# Patient Record
Sex: MALE | Race: BLACK OR AFRICAN AMERICAN | Employment: FULL TIME | URBAN - METROPOLITAN AREA
[De-identification: names, ages, dates, MRNs, and addresses within clinical notes are randomized per-mention and may not be internally consistent; named-entity substitution may affect disease eponyms.]

---

## 2022-04-26 ENCOUNTER — ANESTHESIA EVENT (OUTPATIENT)
Dept: MEDSURG UNIT | Age: 32
End: 2022-04-26
Payer: COMMERCIAL

## 2022-04-27 ENCOUNTER — HOSPITAL ENCOUNTER (OUTPATIENT)
Age: 32
Setting detail: OUTPATIENT SURGERY
Discharge: HOME | End: 2022-04-27
Attending: OTOLARYNGOLOGY | Admitting: OTOLARYNGOLOGY
Payer: COMMERCIAL

## 2022-04-27 ENCOUNTER — ANESTHESIA (OUTPATIENT)
Dept: MEDSURG UNIT | Age: 32
End: 2022-04-27
Payer: COMMERCIAL

## 2022-04-27 VITALS
HEIGHT: 66 IN | HEART RATE: 68 BPM | SYSTOLIC BLOOD PRESSURE: 104 MMHG | RESPIRATION RATE: 19 BRPM | TEMPERATURE: 97.8 F | WEIGHT: 155 LBS | OXYGEN SATURATION: 97 % | DIASTOLIC BLOOD PRESSURE: 74 MMHG | BODY MASS INDEX: 24.91 KG/M2

## 2022-04-27 DIAGNOSIS — G89.18 POSTOPERATIVE PAIN: Primary | ICD-10-CM

## 2022-04-27 PROCEDURE — 76210000034 HC AMBSU PH I REC 0.5 TO 1 HR: Performed by: OTOLARYNGOLOGY

## 2022-04-27 PROCEDURE — 77030008683 HC TU ET CUF COVD -A: Performed by: ANESTHESIOLOGY

## 2022-04-27 PROCEDURE — 77030019908 HC STETH ESOPH SIMS -A: Performed by: STUDENT IN AN ORGANIZED HEALTH CARE EDUCATION/TRAINING PROGRAM

## 2022-04-27 PROCEDURE — 76030000001 HC AMB SURG OR TIME 1 TO 1.5: Performed by: OTOLARYNGOLOGY

## 2022-04-27 PROCEDURE — 74011000250 HC RX REV CODE- 250: Performed by: OTOLARYNGOLOGY

## 2022-04-27 PROCEDURE — 74011250636 HC RX REV CODE- 250/636: Performed by: ANESTHESIOLOGY

## 2022-04-27 PROCEDURE — 76060000062 HC AMB SURG ANES 1 TO 1.5 HR: Performed by: OTOLARYNGOLOGY

## 2022-04-27 PROCEDURE — 88305 TISSUE EXAM BY PATHOLOGIST: CPT

## 2022-04-27 PROCEDURE — 77030026438 HC STYL ET INTUB CARD -A: Performed by: ANESTHESIOLOGY

## 2022-04-27 PROCEDURE — 77030036990: Performed by: OTOLARYNGOLOGY

## 2022-04-27 PROCEDURE — 2709999900 HC NON-CHARGEABLE SUPPLY: Performed by: OTOLARYNGOLOGY

## 2022-04-27 PROCEDURE — 74011000250 HC RX REV CODE- 250: Performed by: ANESTHESIOLOGY

## 2022-04-27 PROCEDURE — 77030040922 HC BLNKT HYPOTHRM STRY -A

## 2022-04-27 RX ORDER — FENTANYL CITRATE 50 UG/ML
INJECTION, SOLUTION INTRAMUSCULAR; INTRAVENOUS AS NEEDED
Status: DISCONTINUED | OUTPATIENT
Start: 2022-04-27 | End: 2022-04-27 | Stop reason: HOSPADM

## 2022-04-27 RX ORDER — HYDROMORPHONE HYDROCHLORIDE 1 MG/ML
0.2 INJECTION, SOLUTION INTRAMUSCULAR; INTRAVENOUS; SUBCUTANEOUS
Status: DISCONTINUED | OUTPATIENT
Start: 2022-04-27 | End: 2022-04-27 | Stop reason: HOSPADM

## 2022-04-27 RX ORDER — MIDAZOLAM HYDROCHLORIDE 1 MG/ML
1 INJECTION, SOLUTION INTRAMUSCULAR; INTRAVENOUS AS NEEDED
Status: DISCONTINUED | OUTPATIENT
Start: 2022-04-27 | End: 2022-04-27 | Stop reason: HOSPADM

## 2022-04-27 RX ORDER — SODIUM CHLORIDE, SODIUM LACTATE, POTASSIUM CHLORIDE, CALCIUM CHLORIDE 600; 310; 30; 20 MG/100ML; MG/100ML; MG/100ML; MG/100ML
125 INJECTION, SOLUTION INTRAVENOUS CONTINUOUS
Status: DISCONTINUED | OUTPATIENT
Start: 2022-04-27 | End: 2022-04-27 | Stop reason: HOSPADM

## 2022-04-27 RX ORDER — SUCCINYLCHOLINE CHLORIDE 20 MG/ML
INJECTION INTRAMUSCULAR; INTRAVENOUS AS NEEDED
Status: DISCONTINUED | OUTPATIENT
Start: 2022-04-27 | End: 2022-04-27 | Stop reason: HOSPADM

## 2022-04-27 RX ORDER — SODIUM CHLORIDE 0.9 % (FLUSH) 0.9 %
5-40 SYRINGE (ML) INJECTION AS NEEDED
Status: DISCONTINUED | OUTPATIENT
Start: 2022-04-27 | End: 2022-04-27 | Stop reason: HOSPADM

## 2022-04-27 RX ORDER — FENTANYL CITRATE 50 UG/ML
50 INJECTION, SOLUTION INTRAMUSCULAR; INTRAVENOUS AS NEEDED
Status: DISCONTINUED | OUTPATIENT
Start: 2022-04-27 | End: 2022-04-27 | Stop reason: HOSPADM

## 2022-04-27 RX ORDER — SODIUM CHLORIDE 0.9 % (FLUSH) 0.9 %
5-40 SYRINGE (ML) INJECTION EVERY 8 HOURS
Status: DISCONTINUED | OUTPATIENT
Start: 2022-04-27 | End: 2022-04-27 | Stop reason: HOSPADM

## 2022-04-27 RX ORDER — LIDOCAINE HYDROCHLORIDE 20 MG/ML
INJECTION, SOLUTION EPIDURAL; INFILTRATION; INTRACAUDAL; PERINEURAL AS NEEDED
Status: DISCONTINUED | OUTPATIENT
Start: 2022-04-27 | End: 2022-04-27 | Stop reason: HOSPADM

## 2022-04-27 RX ORDER — SODIUM CHLORIDE 9 MG/ML
125 INJECTION, SOLUTION INTRAVENOUS CONTINUOUS
Status: DISCONTINUED | OUTPATIENT
Start: 2022-04-27 | End: 2022-04-27 | Stop reason: HOSPADM

## 2022-04-27 RX ORDER — SODIUM CHLORIDE 9 MG/ML
1000 INJECTION, SOLUTION INTRAVENOUS CONTINUOUS
Status: DISCONTINUED | OUTPATIENT
Start: 2022-04-27 | End: 2022-04-27 | Stop reason: HOSPADM

## 2022-04-27 RX ORDER — SODIUM CHLORIDE, SODIUM LACTATE, POTASSIUM CHLORIDE, CALCIUM CHLORIDE 600; 310; 30; 20 MG/100ML; MG/100ML; MG/100ML; MG/100ML
1000 INJECTION, SOLUTION INTRAVENOUS CONTINUOUS
Status: DISCONTINUED | OUTPATIENT
Start: 2022-04-27 | End: 2022-04-27 | Stop reason: HOSPADM

## 2022-04-27 RX ORDER — EPHEDRINE SULFATE/0.9% NACL/PF 50 MG/5 ML
5 SYRINGE (ML) INTRAVENOUS AS NEEDED
Status: DISCONTINUED | OUTPATIENT
Start: 2022-04-27 | End: 2022-04-27 | Stop reason: HOSPADM

## 2022-04-27 RX ORDER — MIDAZOLAM HYDROCHLORIDE 1 MG/ML
INJECTION, SOLUTION INTRAMUSCULAR; INTRAVENOUS AS NEEDED
Status: DISCONTINUED | OUTPATIENT
Start: 2022-04-27 | End: 2022-04-27 | Stop reason: HOSPADM

## 2022-04-27 RX ORDER — MIDAZOLAM HYDROCHLORIDE 1 MG/ML
0.5 INJECTION, SOLUTION INTRAMUSCULAR; INTRAVENOUS
Status: DISCONTINUED | OUTPATIENT
Start: 2022-04-27 | End: 2022-04-27 | Stop reason: HOSPADM

## 2022-04-27 RX ORDER — ONDANSETRON 2 MG/ML
4 INJECTION INTRAMUSCULAR; INTRAVENOUS AS NEEDED
Status: DISCONTINUED | OUTPATIENT
Start: 2022-04-27 | End: 2022-04-27 | Stop reason: HOSPADM

## 2022-04-27 RX ORDER — LIDOCAINE HYDROCHLORIDE 10 MG/ML
0.1 INJECTION, SOLUTION EPIDURAL; INFILTRATION; INTRACAUDAL; PERINEURAL AS NEEDED
Status: DISCONTINUED | OUTPATIENT
Start: 2022-04-27 | End: 2022-04-27 | Stop reason: HOSPADM

## 2022-04-27 RX ORDER — LIDOCAINE HYDROCHLORIDE AND EPINEPHRINE 10; 10 MG/ML; UG/ML
INJECTION, SOLUTION INFILTRATION; PERINEURAL AS NEEDED
Status: DISCONTINUED | OUTPATIENT
Start: 2022-04-27 | End: 2022-04-27 | Stop reason: HOSPADM

## 2022-04-27 RX ORDER — SODIUM CHLORIDE, SODIUM LACTATE, POTASSIUM CHLORIDE, CALCIUM CHLORIDE 600; 310; 30; 20 MG/100ML; MG/100ML; MG/100ML; MG/100ML
INJECTION, SOLUTION INTRAVENOUS
Status: DISCONTINUED | OUTPATIENT
Start: 2022-04-27 | End: 2022-04-27 | Stop reason: HOSPADM

## 2022-04-27 RX ORDER — ONDANSETRON 2 MG/ML
INJECTION INTRAMUSCULAR; INTRAVENOUS AS NEEDED
Status: DISCONTINUED | OUTPATIENT
Start: 2022-04-27 | End: 2022-04-27 | Stop reason: HOSPADM

## 2022-04-27 RX ORDER — HYDROCODONE BITARTRATE AND ACETAMINOPHEN 5; 325 MG/1; MG/1
1 TABLET ORAL
Qty: 15 TABLET | Refills: 0 | Status: SHIPPED | OUTPATIENT
Start: 2022-04-27 | End: 2022-04-30

## 2022-04-27 RX ORDER — MORPHINE SULFATE 2 MG/ML
2 INJECTION, SOLUTION INTRAMUSCULAR; INTRAVENOUS
Status: DISCONTINUED | OUTPATIENT
Start: 2022-04-27 | End: 2022-04-27 | Stop reason: HOSPADM

## 2022-04-27 RX ORDER — OXYCODONE AND ACETAMINOPHEN 5; 325 MG/1; MG/1
1 TABLET ORAL AS NEEDED
Status: DISCONTINUED | OUTPATIENT
Start: 2022-04-27 | End: 2022-04-27 | Stop reason: HOSPADM

## 2022-04-27 RX ORDER — METHYLPREDNISOLONE 4 MG/1
TABLET ORAL
Qty: 1 DOSE PACK | Refills: 0 | Status: SHIPPED | OUTPATIENT
Start: 2022-04-27

## 2022-04-27 RX ORDER — FENTANYL CITRATE 50 UG/ML
25 INJECTION, SOLUTION INTRAMUSCULAR; INTRAVENOUS
Status: DISCONTINUED | OUTPATIENT
Start: 2022-04-27 | End: 2022-04-27 | Stop reason: HOSPADM

## 2022-04-27 RX ORDER — DEXMEDETOMIDINE HYDROCHLORIDE 100 UG/ML
INJECTION, SOLUTION INTRAVENOUS AS NEEDED
Status: DISCONTINUED | OUTPATIENT
Start: 2022-04-27 | End: 2022-04-27 | Stop reason: HOSPADM

## 2022-04-27 RX ORDER — ROCURONIUM BROMIDE 10 MG/ML
INJECTION, SOLUTION INTRAVENOUS AS NEEDED
Status: DISCONTINUED | OUTPATIENT
Start: 2022-04-27 | End: 2022-04-27 | Stop reason: HOSPADM

## 2022-04-27 RX ORDER — PROPOFOL 10 MG/ML
INJECTION, EMULSION INTRAVENOUS AS NEEDED
Status: DISCONTINUED | OUTPATIENT
Start: 2022-04-27 | End: 2022-04-27 | Stop reason: HOSPADM

## 2022-04-27 RX ORDER — ACETAMINOPHEN 500 MG
1000 TABLET ORAL ONCE
Status: DISCONTINUED | OUTPATIENT
Start: 2022-04-27 | End: 2022-04-27 | Stop reason: HOSPADM

## 2022-04-27 RX ORDER — DIPHENHYDRAMINE HYDROCHLORIDE 50 MG/ML
12.5 INJECTION, SOLUTION INTRAMUSCULAR; INTRAVENOUS AS NEEDED
Status: DISCONTINUED | OUTPATIENT
Start: 2022-04-27 | End: 2022-04-27 | Stop reason: HOSPADM

## 2022-04-27 RX ORDER — PHENYLEPHRINE HCL IN 0.9% NACL 0.4MG/10ML
SYRINGE (ML) INTRAVENOUS AS NEEDED
Status: DISCONTINUED | OUTPATIENT
Start: 2022-04-27 | End: 2022-04-27 | Stop reason: HOSPADM

## 2022-04-27 RX ORDER — DEXAMETHASONE SODIUM PHOSPHATE 4 MG/ML
INJECTION, SOLUTION INTRA-ARTICULAR; INTRALESIONAL; INTRAMUSCULAR; INTRAVENOUS; SOFT TISSUE AS NEEDED
Status: DISCONTINUED | OUTPATIENT
Start: 2022-04-27 | End: 2022-04-27 | Stop reason: HOSPADM

## 2022-04-27 RX ADMIN — DEXMEDETOMIDINE HYDROCHLORIDE 4 MCG: 100 INJECTION, SOLUTION, CONCENTRATE INTRAVENOUS at 08:51

## 2022-04-27 RX ADMIN — DEXAMETHASONE SODIUM PHOSPHATE 10 MG: 4 INJECTION, SOLUTION INTRAMUSCULAR; INTRAVENOUS at 08:26

## 2022-04-27 RX ADMIN — MIDAZOLAM 2 MG: 1 INJECTION INTRAMUSCULAR; INTRAVENOUS at 08:18

## 2022-04-27 RX ADMIN — Medication 80 MCG: at 09:02

## 2022-04-27 RX ADMIN — ROCURONIUM BROMIDE 5 MG: 10 SOLUTION INTRAVENOUS at 08:25

## 2022-04-27 RX ADMIN — LIDOCAINE HYDROCHLORIDE 60 MG: 20 INJECTION, SOLUTION EPIDURAL; INFILTRATION; INTRACAUDAL; PERINEURAL at 08:25

## 2022-04-27 RX ADMIN — Medication 80 MCG: at 08:41

## 2022-04-27 RX ADMIN — SODIUM CHLORIDE, SODIUM LACTATE, POTASSIUM CHLORIDE, AND CALCIUM CHLORIDE: 600; 310; 30; 20 INJECTION, SOLUTION INTRAVENOUS at 08:18

## 2022-04-27 RX ADMIN — Medication 40 MCG: at 08:51

## 2022-04-27 RX ADMIN — PROPOFOL 200 MG: 10 INJECTION, EMULSION INTRAVENOUS at 08:25

## 2022-04-27 RX ADMIN — SUCCINYLCHOLINE CHLORIDE 140 MG: 20 INJECTION, SOLUTION INTRAMUSCULAR; INTRAVENOUS at 08:26

## 2022-04-27 RX ADMIN — ONDANSETRON HYDROCHLORIDE 4 MG: 2 INJECTION, SOLUTION INTRAMUSCULAR; INTRAVENOUS at 08:32

## 2022-04-27 RX ADMIN — Medication 80 MCG: at 08:36

## 2022-04-27 RX ADMIN — FENTANYL CITRATE 50 MCG: 50 INJECTION, SOLUTION INTRAMUSCULAR; INTRAVENOUS at 08:34

## 2022-04-27 RX ADMIN — FENTANYL CITRATE 50 MCG: 50 INJECTION, SOLUTION INTRAMUSCULAR; INTRAVENOUS at 08:25

## 2022-04-27 NOTE — ANESTHESIA POSTPROCEDURE EVALUATION
Procedure(s):  SUSPENSION LARYNGOSCOPY WITH REMOVAL OF PAPILLOMAS.    general    Anesthesia Post Evaluation      Multimodal analgesia: multimodal analgesia used between 6 hours prior to anesthesia start to PACU discharge  Patient location during evaluation: bedside  Patient participation: complete - patient participated  Level of consciousness: awake  Pain score: 0  Pain management: satisfactory to patient  Airway patency: patent  Anesthetic complications: no  Cardiovascular status: acceptable and blood pressure returned to baseline  Respiratory status: acceptable  Hydration status: acceptable  Comments: I have evaluated the patient and meets criteria for discharge from PACU. Vickey Castellon DO. Post anesthesia nausea and vomiting:  none  Final Post Anesthesia Temperature Assessment:  Normothermia (36.0-37.5 degrees C)      INITIAL Post-op Vital signs:   Vitals Value Taken Time   /67 04/27/22 0940   Temp 36.6 °C (97.8 °F) 04/27/22 0927   Pulse 57 04/27/22 0941   Resp 12 04/27/22 0941   SpO2 99 % 04/27/22 0941   Vitals shown include unvalidated device data.

## 2022-04-27 NOTE — DISCHARGE INSTRUCTIONS
POSTOPERTIVE INSTRUCTIONS:      ACTIVITIES   For the next 24 hours, do not drive or operate dangerous machinery or  power tools, drink alcoholic beverages, make any important personal or  business decisions, or sign any legal documents.  Limit your activity for the rest of the day.  Do NOT engage in sports, heavy work or lifting for two weeks following  Surgery. Do not lift over 10 pounds for the next two weeks. · Do not whisper  · Do not yell or scream  · Do not sing  · Try to rest your voice as much as possible. · It is ok to cough or drink water if you feel scratchiness in your throat. Try not to \"clear\" your throat by grunting. DIET   Start with cool clear liquids (water, apple juice, Pepsi, Coke, gingerale,  Popsicles).  Advance your diet as tolerated to a regular diet.  AVOID HOT DRINKS like coffee, tea, and soup on the day of surgery. MEDICATIONS  Resume your normal medications as prescribed by your primary doctor. Take the steroid (medrol dosepak) that has been sent to your pharmacy. Pain medication: _____Norco (hydrocodone with tylenol)______.  DO NOT DRIVE, OPERATE DANGEROUS MACHINERY, OR DRINK  ALCOHOL WHILE TAKING NARCOTIC PAIN MEDICATION. Extra Strength Tylenol (acetaminophen)   You may take 2 tablets every 6 hours as needed for pain.  Do not take while still taking your narcotic pain medication. WHEN TO CALL YOUR DOCTOR   A temperature greater than 100 F or 38 C.  Swelling over the face (forehead, eyes, nose, or cheeks).  Blurred vision.  Stiff neck.  Extreme drowsiness after the first day.  Inability to urinate 8 hours after surgery.  Vomiting lasting more than 4 hours.  Any other symptoms which concern you. If you have any questions or concerns call my clinic at [phone].   Call [phone] if you have chest pain or shortness of breath    FOLLOW UP:  Please call Dr. Parrish Sensing office and set up a return appointment for 10-14 days from now.    Gui Whitt, [address], [facility]  [address] [facility], [address]   (k) [phone] (z) [phone]

## 2022-04-27 NOTE — BRIEF OP NOTE
Brief Postoperative Note    Patient: Zoraida Armstrong  YOB: 1990  MRN: [id number]    Date of Procedure: 4/27/2022     Pre-Op Diagnosis: HOARSENESS/DYSPHONIA, VOCAL CORD POLYPS    Post-Op Diagnosis: Same as preoperative diagnosis.       Procedure(s):  SUSPENSION LARYNGOSCOPY WITH REMOVAL OF PAPILLOMAS    Surgeon(s):  Katey York MD    Surgical Assistant: None    Anesthesia: General     Estimated Blood Loss (mL): Minimal    Complications: None    Specimens:   ID Type Source Tests Collected by Time Destination   1 : LARYNGEAL PAPILLOMA Fresh   Katey York MD 4/27/2022 [facility] Pathology        Implants: * No implants in log *    Drains: * No LDAs found *    Findings: supraglottic, glottic and subglottic papillomas removed with coblation    Electronically Signed by Ammy Hernandez MD on 4/27/2022 at 9:00 AM

## 2022-04-27 NOTE — ANESTHESIA PREPROCEDURE EVALUATION
Relevant Problems   No relevant active problems       Anesthetic History   No history of anesthetic complications            Review of Systems / Medical History  Patient summary reviewed, nursing notes reviewed and pertinent labs reviewed    Pulmonary  Within defined limits                 Neuro/Psych   Within defined limits           Cardiovascular  Within defined limits                     GI/Hepatic/Renal  Within defined limits              Endo/Other  Within defined limits           Other Findings   Comments: Recurrent laryngeal papillomatosis           Physical Exam    Airway  Mallampati: II  TM Distance: 4 - 6 cm  Neck ROM: normal range of motion   Mouth opening: Normal     Cardiovascular    Rhythm: regular  Rate: normal         Dental  No notable dental hx       Pulmonary  Breath sounds clear to auscultation               Abdominal         Other Findings            Anesthetic Plan    ASA: 2  Anesthesia type: general          Induction: Intravenous  Anesthetic plan and risks discussed with: Patient

## 2022-04-27 NOTE — OP NOTES
[address] [facility]  OPERATIVE REPORT    Name:  Karen Mclaughlin  MR#:  [id number]  :  1990  ACCOUNT #:  [de-identified]  DATE OF SERVICE:  2022      PREOPERATIVE DIAGNOSIS:  Recurrent respiratory papillomas. POSTOPERATIVE DIAGNOSIS:  Recurrent respiratory papillomas. PROCEDURES PERFORMED:  Suspension microlaryngoscopy with removal and ablation of respiratory papillomas. SURGEON:  Palomo Alvarado MD    ASSISTANT:  None. ANESTHESIA:  General endotracheal.    COMPLICATIONS:  None. SPECIMENS REMOVED:  Laryngeal papillomas sent for permanent specimen. IMPLANTS:  None. ESTIMATED BLOOD LOSS:  Minimal.    IV FLUIDS:  1 L. DRAINS:  None. DISPOSITION:  PACU, then home. CONDITION:  Stable. OPERATING FINDINGS:  1. There were papillomas located on the right and left false vocal cord. Additionally, there were polyps on the right and left glottis and in the subglottis below the anterior commissure. 2.  These were removed with the Coblator. 3.  There were no apparent complications. BRIEF HISTORY OF PRESENT ILLNESS:  The patient is a 77-year-old gentleman with the history of recurrent respiratory papillomas. He had undergone a previous surgery for removal.  As is typically the case with this diagnosis, the papillomas recurred. He presents for repeat excision. DESCRIPTION OF PROCEDURE:  The patient was identified in the preoperative holding area and then he was brought to the operating room and he was placed in supine position. General anesthesia was induced and he was orotracheally intubated. A time-out was performed in which we identified his name, medical record number, and the proposed procedure. His face was wrapped in a blue towel to protect his eyes. A mouthguard was placed in his teeth to protect against dental injury. A Dedo laryngoscope was used to perform a direct laryngoscopy. He was suspended from a mustard stand after we had good exposure.   I used a 0-degree telescope to obtain preoperative photos of the papillomas as detailed above. I then brought in a operating microscope for binocular exposure. I used a Coblator to remove the papillomas first located in the subglottis on the right and the left. It was more bulky on the left side. I then used the Coblator to remove the papillomas on the left glottis and right glottis. It was more bulky on the left side. I used a pair of biopsy forceps to debulk the papillomas in the anterior commissure. I send these for permanent specimen. I then used Coblator to remove the remainder of the polyps that were located here and also within the more anterior subglottis. I confirmed hemostasis, but then placed neuro patties hydrated with 1% lidocaine with 1:100,000 parts epinephrine to prevent postop bleeding and to prevent laryngospasm in the immediate extubation phase. I then removed these patties and also removed the scope. I removed the mouthguard and confirmed there was no damage to the lips, teeth, or gums. I turned care of the patient over to my anesthesia colleague who weaned the anesthetic and extubated the patient. He was then transferred to the PACU in stable condition.         Jacinta Kumar MD      [id number]  D:  04/27/2022 9:13  T:  04/27/2022 17:30  JOB #:  [id number]

## 2022-04-27 NOTE — H&P
OTOLARYNGOLOGY - HEAD AND NECK SURGERY HISTORY AND PHYSICAL      CC:   hoarseness    HPI:     Adebayo Age is a 33 y.o. male with laryngeal papillomas (RRP) presenting for papilloma removal.  No major changes since office visit. History reviewed. No pertinent past medical history. Past Surgical History:   Procedure Laterality Date    HX HEENT  05/2017    laryngoscopy with removal of papillomas     Current Facility-Administered Medications   Medication Dose Route Frequency    lactated Ringers infusion  125 mL/hr IntraVENous CONTINUOUS    0.9% sodium chloride infusion  125 mL/hr IntraVENous CONTINUOUS    sodium chloride (NS) flush 5-40 mL  5-40 mL IntraVENous Q8H    sodium chloride (NS) flush 5-40 mL  5-40 mL IntraVENous PRN    lidocaine (PF) (XYLOCAINE) 10 mg/mL (1 %) injection 0.1 mL  0.1 mL SubCUTAneous PRN    fentaNYL citrate (PF) injection 50 mcg  50 mcg IntraVENous PRN    midazolam (VERSED) injection 1 mg  1 mg IntraVENous PRN    midazolam (VERSED) injection 1 mg  1 mg IntraVENous PRN    acetaminophen (TYLENOL) tablet 1,000 mg  1,000 mg Oral ONCE      Allergies   Allergen Reactions    Pcn [Penicillins] Unknown (comments)     Pt doesn't know reaction, childhood     No family history on file. Social History     Tobacco Use    Smoking status: Not on file    Smokeless tobacco: Not on file   Substance Use Topics    Alcohol use: Not on file    Drug use: Not on file         REVIEW OF SYSTEMS  A full 10 point review of systems was performed today. The review was non-pertinent other than the details already listed in the history of present illness. There were no vitals taken for this visit. PHYSICAL EXAM  General:  No acute distress. Alert and oriented x 3. MSK:   Normal muscle bulk  Psych:  Mood and affect appropriate. Neuro:  CN II - XII grossly intact bilaterally. Eyes:  PERRL/EOMI, no nystagmus. ENT:   EACs are patent, clean and dry.  TMs clear and intact with normal anatomic landmarks. No middle ear fluid. Anterior rhinoscopy without mucopurulence or polyps. OC/OP clear without masses or lesions. Lymph:  Neck soft and supple without lymphadenopathy. Resp:   No audible stridor or wheezing. Skin:   Head and neck skin is without suspicious lesions. ASSESSMENT/PLAN:  33 y.o. M with recurrent respiratory papillomas. We reviewed the risks of surgery including need for additional surgery, bleeding, infection, chipped or damaged teeth, damage to lips/gums/throat, bleeding, difficulty breathing, worsened dysphonia, recurrence, scarring and the general risks of anesthesia including death as explained by the anesthesiology team.  Written consent obtained for direct laryngoscopy and removal of respiratory papillomas. Will plan to proceed with procedure as discussed. The patient was counseled at length about the risks of adrien Covid-19 during their perioperative period and any recovery window from their procedure. The patient was made aware that adrien Covid-19  may worsen their prognosis for recovering from their procedure and lend to a higher morbidity and/or mortality risk. All material risks, benefits, and reasonable alternatives including postponing the procedure were discussed. The patient does  wish to proceed with the procedure at this time. Lizzie Morales MD

## (undated) DEVICE — SOLUTION IRRIG 1000ML 0.9% SOD CHL USP POUR PLAS BTL

## (undated) DEVICE — DRAPE,REIN 53X77,STERILE: Brand: MEDLINE

## (undated) DEVICE — KIT,ANTI FOG,W/SPONGE & FLUID,SOFT PACK: Brand: MEDLINE

## (undated) DEVICE — GLOVE ORANGE PI 7   MSG9070

## (undated) DEVICE — BASIN SET MIN W/O CAUT PNCL --

## (undated) DEVICE — PROCISE LW WAND: Brand: COBLATION

## (undated) DEVICE — Device

## (undated) DEVICE — PAD,NON-ADHERENT,W/AD,3X4,ST,LF,1/PK: Brand: MEDLINE

## (undated) DEVICE — PACKING 8004000 NEURAY 200PK 13X13MM: Brand: NEURAY ®

## (undated) DEVICE — TOWEL,OR,DSP,ST,BLUE,STD,2/PK,40PK/CS: Brand: MEDLINE

## (undated) DEVICE — TUBING, SUCTION, 1/4" X 10', STRAIGHT: Brand: MEDLINE

## (undated) DEVICE — BLUNTFILL: Brand: MONOJECT